# Patient Record
Sex: MALE | Race: WHITE
[De-identification: names, ages, dates, MRNs, and addresses within clinical notes are randomized per-mention and may not be internally consistent; named-entity substitution may affect disease eponyms.]

---

## 2019-07-26 ENCOUNTER — HOSPITAL ENCOUNTER (INPATIENT)
Dept: HOSPITAL 19 - COL.ER | Age: 84
LOS: 2 days | Discharge: HOME | DRG: 854 | End: 2019-07-28
Attending: INTERNAL MEDICINE | Admitting: INTERNAL MEDICINE
Payer: OTHER GOVERNMENT

## 2019-07-26 VITALS — TEMPERATURE: 99.4 F | DIASTOLIC BLOOD PRESSURE: 52 MMHG | SYSTOLIC BLOOD PRESSURE: 127 MMHG | HEART RATE: 81 BPM

## 2019-07-26 VITALS — HEART RATE: 86 BPM | SYSTOLIC BLOOD PRESSURE: 120 MMHG | TEMPERATURE: 99.8 F | DIASTOLIC BLOOD PRESSURE: 55 MMHG

## 2019-07-26 VITALS — HEART RATE: 18 BPM | DIASTOLIC BLOOD PRESSURE: 45 MMHG | SYSTOLIC BLOOD PRESSURE: 105 MMHG | TEMPERATURE: 97.6 F

## 2019-07-26 VITALS — SYSTOLIC BLOOD PRESSURE: 119 MMHG | TEMPERATURE: 100.2 F | HEART RATE: 77 BPM | DIASTOLIC BLOOD PRESSURE: 47 MMHG

## 2019-07-26 VITALS — HEART RATE: 78 BPM | SYSTOLIC BLOOD PRESSURE: 109 MMHG | TEMPERATURE: 98.2 F | DIASTOLIC BLOOD PRESSURE: 47 MMHG

## 2019-07-26 VITALS — HEART RATE: 80 BPM | SYSTOLIC BLOOD PRESSURE: 119 MMHG | TEMPERATURE: 98.2 F | DIASTOLIC BLOOD PRESSURE: 50 MMHG

## 2019-07-26 VITALS — SYSTOLIC BLOOD PRESSURE: 117 MMHG | TEMPERATURE: 97.8 F | DIASTOLIC BLOOD PRESSURE: 55 MMHG | HEART RATE: 81 BPM

## 2019-07-26 VITALS — SYSTOLIC BLOOD PRESSURE: 116 MMHG | DIASTOLIC BLOOD PRESSURE: 49 MMHG | HEART RATE: 70 BPM

## 2019-07-26 VITALS — SYSTOLIC BLOOD PRESSURE: 116 MMHG | HEART RATE: 72 BPM | DIASTOLIC BLOOD PRESSURE: 48 MMHG

## 2019-07-26 VITALS — DIASTOLIC BLOOD PRESSURE: 47 MMHG | SYSTOLIC BLOOD PRESSURE: 114 MMHG | TEMPERATURE: 98.2 F | HEART RATE: 72 BPM

## 2019-07-26 VITALS — WEIGHT: 238.1 LBS | BODY MASS INDEX: 29.6 KG/M2 | HEIGHT: 75 IN

## 2019-07-26 VITALS — HEART RATE: 74 BPM | SYSTOLIC BLOOD PRESSURE: 117 MMHG | DIASTOLIC BLOOD PRESSURE: 55 MMHG

## 2019-07-26 DIAGNOSIS — Z88.8: ICD-10-CM

## 2019-07-26 DIAGNOSIS — R53.81: ICD-10-CM

## 2019-07-26 DIAGNOSIS — E87.8: ICD-10-CM

## 2019-07-26 DIAGNOSIS — E87.6: ICD-10-CM

## 2019-07-26 DIAGNOSIS — G47.33: ICD-10-CM

## 2019-07-26 DIAGNOSIS — A41.9: Primary | ICD-10-CM

## 2019-07-26 DIAGNOSIS — N17.9: ICD-10-CM

## 2019-07-26 DIAGNOSIS — K80.30: ICD-10-CM

## 2019-07-26 DIAGNOSIS — Z87.891: ICD-10-CM

## 2019-07-26 DIAGNOSIS — Z79.02: ICD-10-CM

## 2019-07-26 DIAGNOSIS — G47.00: ICD-10-CM

## 2019-07-26 DIAGNOSIS — D69.6: ICD-10-CM

## 2019-07-26 DIAGNOSIS — I25.2: ICD-10-CM

## 2019-07-26 DIAGNOSIS — Z95.5: ICD-10-CM

## 2019-07-26 DIAGNOSIS — E87.1: ICD-10-CM

## 2019-07-26 DIAGNOSIS — D64.9: ICD-10-CM

## 2019-07-26 DIAGNOSIS — E11.9: ICD-10-CM

## 2019-07-26 DIAGNOSIS — I10: ICD-10-CM

## 2019-07-26 DIAGNOSIS — E87.2: ICD-10-CM

## 2019-07-26 DIAGNOSIS — I25.10: ICD-10-CM

## 2019-07-26 DIAGNOSIS — E78.5: ICD-10-CM

## 2019-07-26 DIAGNOSIS — Z96.642: ICD-10-CM

## 2019-07-26 DIAGNOSIS — R65.20: ICD-10-CM

## 2019-07-26 LAB
ALBUMIN SERPL-MCNC: 3.6 GM/DL (ref 3.5–5)
ALP SERPL-CCNC: 219 U/L (ref 50–136)
ALT SERPL-CCNC: 169 U/L (ref 21–72)
ANION GAP SERPL CALC-SCNC: 16 MMOL/L (ref 7–16)
ANISOCYTOSIS BLD QL: (no result)
AST SERPL-CCNC: 96 U/L (ref 15–37)
BILIRUB SERPL-MCNC: 5 MG/DL (ref 0–1)
BUN SERPL-MCNC: 31 MG/DL (ref 9–20)
CALCIUM SERPL-MCNC: 7.7 MG/DL (ref 8.4–10.2)
CHLORIDE SERPL-SCNC: 94 MMOL/L (ref 98–107)
CO2 SERPL-SCNC: 18 MMOL/L (ref 22–30)
CREAT SERPL-SCNC: 2.45 UMOL/L (ref 0.66–1.25)
CRP SERPL-MCNC: 22.2 MG/DL (ref 0–0.9)
ERYTHROCYTE [DISTWIDTH] IN BLOOD BY AUTOMATED COUNT: 18.3 % (ref 11.5–14.5)
GLUCOSE SERPL-MCNC: 183 MG/DL (ref 74–106)
HCT VFR BLD AUTO: 34.9 % (ref 42–52)
HGB BLD-MCNC: 11.7 G/DL (ref 13.5–18)
HYALINE CASTS #/AREA URNS LPF: >12 /LPF
LIPASE SERPL-CCNC: 37 U/L (ref 23–300)
LYMPHOCYTES NFR BLD MANUAL: 5 % (ref 20–51)
MCH RBC QN AUTO: 28 PG (ref 27–31)
MCHC RBC AUTO-ENTMCNC: 34 G/DL (ref 33–37)
MCV RBC AUTO: 82 FL (ref 80–100)
MONOCYTES NFR BLD: 1 % (ref 1.7–9.3)
NEUTS BAND NFR BLD: 8 % (ref 0–10)
NEUTS SEG NFR BLD MANUAL: 87 % (ref 42–75.2)
PH UR STRIP.AUTO: 5 [PH] (ref 5–8)
PLATELET # BLD AUTO: 126 K/MM3 (ref 130–400)
PLATELET BLD QL SMEAR: (no result)
PMV BLD AUTO: 10 FL (ref 7.4–10.4)
POTASSIUM SERPL-SCNC: 3.8 MMOL/L (ref 3.4–5)
PROT SERPL-MCNC: 7.1 GM/DL (ref 6.4–8.2)
RBC # BLD AUTO: 4.25 M/MM3 (ref 4.2–5.6)
RBC # UR STRIP.AUTO: (no result) /UL
RBC # UR: (no result) /HPF
SODIUM SERPL-SCNC: 128 MMOL/L (ref 137–145)
SP GR UR STRIP.AUTO: 1.01 (ref 1–1.03)
SQUAMOUS # URNS: (no result) /HPF
UA DIPSTICK PNL UR STRIP.AUTO: (no result)
URN COLLECT METHOD CLASS: (no result)
UROBILINOGEN UR STRIP.AUTO-MCNC: >=4 MG/DL

## 2019-07-26 PROCEDURE — 0FCG8ZZ EXTIRPATION OF MATTER FROM PANCREAS, VIA NATURAL OR ARTIFICIAL OPENING ENDOSCOPIC: ICD-10-PCS | Performed by: SPECIALIST

## 2019-07-26 PROCEDURE — BF11YZZ FLUOROSCOPY OF BILIARY AND PANCREATIC DUCTS USING OTHER CONTRAST: ICD-10-PCS | Performed by: PSYCHIATRY & NEUROLOGY

## 2019-07-26 PROCEDURE — C1769 GUIDE WIRE: HCPCS

## 2019-07-26 NOTE — NUR
Patient in bed, reports pain to left shoulder. Denies pain to abdomen at this
time. Is slightly jaundiced in appearance. Has SL to right wrist and left
forearm without redness or swelling. Assisted to bathroom, slightly unsteady
gait without his cane. Has large soft BM and voids. Back to bed. IVF connected
to left forearm site, Zosyn also infusing without redness or swelling. Takes
HS meds without problem.

## 2019-07-27 VITALS — TEMPERATURE: 98 F | DIASTOLIC BLOOD PRESSURE: 63 MMHG | HEART RATE: 79 BPM | SYSTOLIC BLOOD PRESSURE: 138 MMHG

## 2019-07-27 VITALS — SYSTOLIC BLOOD PRESSURE: 146 MMHG | TEMPERATURE: 98.5 F | DIASTOLIC BLOOD PRESSURE: 66 MMHG | HEART RATE: 71 BPM

## 2019-07-27 VITALS — HEART RATE: 79 BPM | SYSTOLIC BLOOD PRESSURE: 142 MMHG | DIASTOLIC BLOOD PRESSURE: 90 MMHG | TEMPERATURE: 99.5 F

## 2019-07-27 VITALS — HEART RATE: 81 BPM | DIASTOLIC BLOOD PRESSURE: 57 MMHG | TEMPERATURE: 98.4 F | SYSTOLIC BLOOD PRESSURE: 130 MMHG

## 2019-07-27 VITALS — TEMPERATURE: 98.9 F | SYSTOLIC BLOOD PRESSURE: 137 MMHG | HEART RATE: 76 BPM | DIASTOLIC BLOOD PRESSURE: 58 MMHG

## 2019-07-27 LAB
ALBUMIN SERPL-MCNC: 3 GM/DL (ref 3.5–5)
ALP SERPL-CCNC: 197 U/L (ref 50–136)
ALT SERPL-CCNC: 101 U/L (ref 21–72)
ANION GAP SERPL CALC-SCNC: 11 MMOL/L (ref 7–16)
AST SERPL-CCNC: 49 U/L (ref 15–37)
BASOPHILS # BLD: 0 10*3/UL (ref 0–0.2)
BASOPHILS NFR BLD AUTO: 0.2 % (ref 0–2)
BILIRUB SERPL-MCNC: 3.1 MG/DL (ref 0–1)
BUN SERPL-MCNC: 27 MG/DL (ref 9–20)
CALCIUM SERPL-MCNC: 7.5 MG/DL (ref 8.4–10.2)
CHLORIDE SERPL-SCNC: 104 MMOL/L (ref 98–107)
CO2 SERPL-SCNC: 20 MMOL/L (ref 22–30)
CREAT SERPL-SCNC: 1.61 UMOL/L (ref 0.66–1.25)
EOSINOPHIL # BLD: 0 10*3/UL (ref 0–0.7)
EOSINOPHIL NFR BLD: 0.2 % (ref 0–4)
ERYTHROCYTE [DISTWIDTH] IN BLOOD BY AUTOMATED COUNT: 18.1 % (ref 11.5–14.5)
GLUCOSE SERPL-MCNC: 118 MG/DL (ref 74–106)
GRANULOCYTES # BLD AUTO: 85.9 % (ref 42.2–75.2)
HCT VFR BLD AUTO: 31.2 % (ref 42–52)
HGB BLD-MCNC: 10.4 G/DL (ref 13.5–18)
LYMPHOCYTES # BLD: 0.7 10*3/UL (ref 1.2–3.4)
LYMPHOCYTES NFR BLD: 5.4 % (ref 20–51)
MCH RBC QN AUTO: 27 PG (ref 27–31)
MCHC RBC AUTO-ENTMCNC: 33 G/DL (ref 33–37)
MCV RBC AUTO: 82 FL (ref 80–100)
MONOCYTES # BLD: 1 10*3/UL (ref 0.1–0.6)
MONOCYTES NFR BLD AUTO: 7.5 % (ref 1.7–9.3)
NEUTROPHILS # BLD: 11.2 10*3/UL (ref 1.4–6.5)
PLATELET # BLD AUTO: 103 K/MM3 (ref 130–400)
PMV BLD AUTO: 10.7 FL (ref 7.4–10.4)
POTASSIUM SERPL-SCNC: 3.2 MMOL/L (ref 3.4–5)
PROT SERPL-MCNC: 6.4 GM/DL (ref 6.4–8.2)
RBC # BLD AUTO: 3.81 M/MM3 (ref 4.2–5.6)
SODIUM SERPL-SCNC: 135 MMOL/L (ref 137–145)

## 2019-07-27 NOTE — NUR
Has used urinal several times, voiding yellow urine without problem. IVF
infusing to left FA without redness or swelling.

## 2019-07-27 NOTE — NUR
Patient has rested intermittently during the shift. Replacing PO potassium per
protocol, patient has one dose left. Should have recieved two units of novolog
per sliding scale, but patient refused, stating insulin gives him diarrhea and
"tears up his guts". Patient denies pain at this time, call light within
reach.

## 2019-07-27 NOTE — NUR
Patient in bed, denies pain at this time. Abdomen distended, soft. Skin mild
jaundice noted. IV site to left FA without redness or swelling, IVF infusing
without problem. SL to right wrist intact. Urine is yellow and reports having
BM today. No concerns offered at this time.

## 2019-07-27 NOTE — NUR
Patient assisted to bathroom, he passes gas and has gelatinous stool on the
floor. After sitting on toilet, passes more gas but no stool. Assisted back to
bed after voiding.

## 2019-07-27 NOTE — NUR
Plan:Patient plans to return homw with wife Yojana [phone] or cell
[phone] as care support. Patient indciated that he would not need HH.
[facility] and also [facility] a [facility]. Patient reports that his [facility] will transport home. Patient
denies having any addtional concerns at this time.
Action: Edcuated on resource available to him.

## 2019-07-28 VITALS — TEMPERATURE: 98.8 F | HEART RATE: 73 BPM | SYSTOLIC BLOOD PRESSURE: 149 MMHG | DIASTOLIC BLOOD PRESSURE: 69 MMHG

## 2019-07-28 VITALS — DIASTOLIC BLOOD PRESSURE: 80 MMHG | SYSTOLIC BLOOD PRESSURE: 118 MMHG | TEMPERATURE: 98.6 F | HEART RATE: 71 BPM

## 2019-07-28 VITALS — DIASTOLIC BLOOD PRESSURE: 69 MMHG | SYSTOLIC BLOOD PRESSURE: 145 MMHG | TEMPERATURE: 98.4 F | HEART RATE: 77 BPM

## 2019-07-28 LAB
ALBUMIN SERPL-MCNC: 3.1 GM/DL (ref 3.5–5)
ALP SERPL-CCNC: 185 U/L (ref 50–136)
ALT SERPL-CCNC: 79 U/L (ref 21–72)
ANION GAP SERPL CALC-SCNC: 10 MMOL/L (ref 7–16)
AST SERPL-CCNC: 29 U/L (ref 15–37)
BASOPHILS # BLD: 0 10*3/UL (ref 0–0.2)
BASOPHILS NFR BLD AUTO: 0.3 % (ref 0–2)
BILIRUB SERPL-MCNC: 2.3 MG/DL (ref 0–1)
BUN SERPL-MCNC: 14 MG/DL (ref 9–20)
CALCIUM SERPL-MCNC: 8.1 MG/DL (ref 8.4–10.2)
CHLORIDE SERPL-SCNC: 105 MMOL/L (ref 98–107)
CO2 SERPL-SCNC: 22 MMOL/L (ref 22–30)
CREAT SERPL-SCNC: 0.86 UMOL/L (ref 0.66–1.25)
EOSINOPHIL # BLD: 0.1 10*3/UL (ref 0–0.7)
EOSINOPHIL NFR BLD: 0.9 % (ref 0–4)
ERYTHROCYTE [DISTWIDTH] IN BLOOD BY AUTOMATED COUNT: 18 % (ref 11.5–14.5)
GLUCOSE SERPL-MCNC: 131 MG/DL (ref 74–106)
GRANULOCYTES # BLD AUTO: 80.7 % (ref 42.2–75.2)
HCT VFR BLD AUTO: 31.2 % (ref 42–52)
HGB BLD-MCNC: 10.5 G/DL (ref 13.5–18)
LYMPHOCYTES # BLD: 1 10*3/UL (ref 1.2–3.4)
LYMPHOCYTES NFR BLD: 8.8 % (ref 20–51)
MAGNESIUM SERPL-MCNC: 1.8 MG/DL (ref 1.6–2.3)
MCH RBC QN AUTO: 27 PG (ref 27–31)
MCHC RBC AUTO-ENTMCNC: 34 G/DL (ref 33–37)
MCV RBC AUTO: 80 FL (ref 80–100)
MONOCYTES # BLD: 1 10*3/UL (ref 0.1–0.6)
MONOCYTES NFR BLD AUTO: 8.4 % (ref 1.7–9.3)
NEUTROPHILS # BLD: 9.5 10*3/UL (ref 1.4–6.5)
PLATELET # BLD AUTO: 112 K/MM3 (ref 130–400)
PMV BLD AUTO: 10.5 FL (ref 7.4–10.4)
POTASSIUM SERPL-SCNC: 3.4 MMOL/L (ref 3.4–5)
PROT SERPL-MCNC: 6.4 GM/DL (ref 6.4–8.2)
RBC # BLD AUTO: 3.88 M/MM3 (ref 4.2–5.6)
SODIUM SERPL-SCNC: 137 MMOL/L (ref 137–145)

## 2019-07-28 NOTE — NUR
Patient awake, asking for cold drink, offered water and he stated "yuck". Diet
Pepsi given per his request.

## 2019-07-28 NOTE — NUR
Discharge teaching given to patient and daughter. Importance of follow up
appointments and discharge medications stressed. Patient and daughter
verbalize understanding. Confirmed that discharge meds were sent to [facility]
pharmacy, instructions to make follow up appointments on discharge summary,
phone numbers and instructions reviewed. IV from left forearm and INT from
right wrist were removed, hemostasis achieved. Patient and daughter deny
questions or needs. Patient belongings collected and patient escorted to
private vehicle by surgical staff via wheel chair.

## 2019-07-28 NOTE — NUR
Patient has had no complaints today. Patient has eaten breakfast and lunch, no
complaints of pain or nausea. IVF continue to LF per order. PO potassium
administered per order. Patient denies needs, call light within reach.

## 2019-09-27 ENCOUNTER — HOSPITAL ENCOUNTER (OUTPATIENT)
Dept: HOSPITAL 19 - COL.CR | Age: 84
LOS: 40 days | Discharge: HOME | End: 2019-11-06
Payer: COMMERCIAL

## 2019-09-27 DIAGNOSIS — Z95.5: ICD-10-CM

## 2019-09-27 DIAGNOSIS — Z48.812: Primary | ICD-10-CM
